# Patient Record
Sex: FEMALE | Race: OTHER | Employment: OTHER | URBAN - METROPOLITAN AREA
[De-identification: names, ages, dates, MRNs, and addresses within clinical notes are randomized per-mention and may not be internally consistent; named-entity substitution may affect disease eponyms.]

---

## 2018-06-08 ENCOUNTER — OFFICE VISIT (OUTPATIENT)
Dept: URGENT CARE | Facility: CLINIC | Age: 83
End: 2018-06-08
Payer: MEDICARE

## 2018-06-08 VITALS
BODY MASS INDEX: 22.47 KG/M2 | HEART RATE: 88 BPM | HEIGHT: 61 IN | TEMPERATURE: 98 F | WEIGHT: 119 LBS | RESPIRATION RATE: 16 BRPM | SYSTOLIC BLOOD PRESSURE: 124 MMHG | OXYGEN SATURATION: 95 % | DIASTOLIC BLOOD PRESSURE: 80 MMHG

## 2018-06-08 DIAGNOSIS — B02.9 HERPES ZOSTER WITHOUT COMPLICATION: ICD-10-CM

## 2018-06-08 PROCEDURE — 99204 OFFICE O/P NEW MOD 45 MIN: CPT | Performed by: FAMILY MEDICINE

## 2018-06-08 RX ORDER — ACYCLOVIR 800 MG/1
800 TABLET ORAL
Qty: 50 TAB | Refills: 0 | Status: SHIPPED | OUTPATIENT
Start: 2018-06-08 | End: 2018-06-18

## 2018-06-08 RX ORDER — LIDOCAINE 50 MG/G
1 PATCH TOPICAL EVERY 24 HOURS
Qty: 30 PATCH | Refills: 0 | Status: SHIPPED | OUTPATIENT
Start: 2018-06-08

## 2018-06-08 NOTE — PATIENT INSTRUCTIONS
Shingles  Shingles is an infection that causes a painful skin rash and fluid-filled blisters. Shingles is caused by the same virus that causes chickenpox.  Shingles only develops in people who:  · Have had chickenpox.  · Have gotten the chickenpox vaccine. (This is rare.)  The first symptoms of shingles may be itching, tingling, or pain in an area on your skin. A rash will follow in a few days or weeks. The rash is usually on one side of the body in a bandlike or beltlike pattern. Over time, the rash turns into fluid-filled blisters that break open, scab over, and dry up. Medicines may:  · Help you manage pain.  · Help you recover more quickly.  · Help to prevent long-term problems.  Follow these instructions at home:  Medicines  · Take medicines only as told by your doctor.  · Apply an anti-itch or numbing cream to the affected area as told by your doctor.  Blister and Rash Care  · Take a cool bath or put cool compresses on the area of the rash or blisters as told by your doctor. This may help with pain and itching.  · Keep your rash covered with a loose bandage (dressing). Wear loose-fitting clothing.  · Keep your rash and blisters clean with mild soap and cool water or as told by your doctor.  · Check your rash every day for signs of infection. These include redness, swelling, and pain that lasts or gets worse.  · Do not pick your blisters.  · Do not scratch your rash.  General instructions  · Rest as told by your doctor.  · Keep all follow-up visits as told by your doctor. This is important.  · Until your blisters scab over, your infection can cause chickenpox in people who have never had it or been vaccinated against it. To prevent this from happening, avoid touching other people or being around other people, especially:  ¨ Babies.  ¨ Pregnant women.  ¨ Children who have eczema.  ¨ Elderly people who have transplants.  ¨ People who have chronic illnesses, such as leukemia or AIDS.  Contact a doctor if:  · Your  pain does not get better with medicine.  · Your pain does not get better after the rash heals.  · Your rash looks infected. Signs of infection include:  ¨ Redness.  ¨ Swelling.  ¨ Pain that lasts or gets worse.  Get help right away if:  · The rash is on your face or nose.  · You have pain in your face, pain around your eye area, or loss of feeling on one side of your face.  · You have ear pain or you have ringing in your ear.  · You have loss of taste.  · Your condition gets worse.  This information is not intended to replace advice given to you by your health care provider. Make sure you discuss any questions you have with your health care provider.  Document Released: 06/05/2009 Document Revised: 08/13/2017 Document Reviewed: 09/29/2015  [employer] Interactive Patient Education © 2017 [employer]

## 2018-06-09 NOTE — PROGRESS NOTES
"Subjective:      Chief Complaint   Patient presents with   • Burn     burn from hot water in shower 10 days ago - getting worse               Rash      This is a new problem.  she burned herself with the hot water in the shower and presents today since the The current episode started yesterday. The problem is unchanged. Pain location: left flank. The rash is characterized by pain, redness and blistering.  Pain is sharp, constant \"burning\" and tender to touch\"       . Associated symptoms include fatigue. Pertinent negatives include no congestion, cough or fever. Past treatments include nothing.       Social History     Social History   • Marital status: Single     Spouse name: N/A   • Number of children: N/A   • Years of education: N/A     Occupational History   • Not on file.     Social History Main Topics   • Smoking status: Never Smoker   • Smokeless tobacco: Never Used   • Alcohol use No   • Drug use: No   • Sexual activity: No     Other Topics Concern   • Not on file     Social History Narrative   • No narrative on file         Past Medical History:   Diagnosis Date   • Anxiety    • Unspecified cataract          Current Outpatient Prescriptions on File Prior to Visit   Medication Sig Dispense Refill   • Multiple Vitamins-Minerals (OCUVITE) TABS Take 1 Tab by mouth every day.     • aspirin EC (ECOTRIN) 81 MG TBEC Take 81 mg by mouth every day.       No current facility-administered medications on file prior to visit.        Family history was reviewed and not pertinent       Review of Systems:  Review of Systems   Constitutional: Negative for fever.   HENT: no neck pain, headache or dizziness  Eyes: denies vision changes  Respiratory: no cough, congestion, SOB  Cardiovascular: denies palpations, chest pain   Gastrointestinal: denies diarrhea, abdominal pain or constipation.  No blood in stool.  Musculoskeletal: denies back pain or joint pain    Skin: +   Rash.   No itching  Neurological: No numbness or tingling. " "  10 point ROS otherwise negative, except per HPI       Objective:     Blood pressure 124/80, pulse 88, temperature 36.7 °C (98 °F), resp. rate 16, height 1.537 m (5' 0.51\"), weight 54 kg (119 lb), SpO2 95 %.    Physical Exam   Constitutional: She is oriented to person, place, and time. She appears well-developed and well-nourished. No distress.   HENT:   Head: Normocephalic and atraumatic.   Mouth/Throat: No oropharyngeal exudate.   Eyes: Conjunctivae are normal. Pupils are equal, round, and reactive to light. No scleral icterus.   Cardiovascular: Normal rate, regular rhythm and normal heart sounds.    Pulmonary/Chest: Effort normal and breath sounds normal. No respiratory distress. Pt has no wheezes. Pt has no rales.   Neurological: pt is alert and oriented to person, place, and time. No cranial nerve deficit.   Skin: Skin is warm. Rash (vesicular lesions on an erythematous base in a dermatome distribution over left flank and left chest, but does not cross midline) noted. Pt is not diaphoretic.   Nursing note and vitals reviewed.              Assessment/Plan:        1. Herpes zoster without complication     - acyclovir (ZOVIRAX) 800 MG Tab; Take 1 Tab by mouth 5 Times a Day for 10 days.  Dispense: 50 Tab; Refill: 0  - lidocaine (LIDODERM) 5 % Patch; Apply 1 Patch to skin as directed every 24 hours.  Dispense: 30 Patch; Refill: 0      Follow up in one week if no improvement, sooner if symptoms worsen.        "

## 2021-01-11 DIAGNOSIS — Z23 NEED FOR VACCINATION: ICD-10-CM

## 2021-05-05 ENCOUNTER — PATIENT OUTREACH (OUTPATIENT)
Dept: HEALTH INFORMATION MANAGEMENT | Facility: OTHER | Age: 86
End: 2021-05-05

## 2021-09-08 NOTE — NON-PROVIDER
Outcome: Left Message to schedule Comprehensive Health Assessment.    Please transfer to Patient Outreach Team at [phone] when patient returns call.        Attempt # 2

## 2024-02-06 ENCOUNTER — OFFICE VISIT (OUTPATIENT)
Facility: LOCATION | Age: 89
End: 2024-02-06
Payer: MEDICARE

## 2024-02-06 DIAGNOSIS — H35.3231 BILATERAL EXUDATIVE AGE-RELATED MACULAR DEGENERATION W/ ACTIVE CHOROIDAL NEOVASCULARIZATION: Primary | ICD-10-CM

## 2024-02-06 DIAGNOSIS — H04.123 DRY EYE SYNDROME OF BILATERAL LACRIMAL GLANDS: ICD-10-CM

## 2024-02-06 PROCEDURE — 92134 CPTRZ OPH DX IMG PST SGM RTA: CPT | Performed by: OPHTHALMOLOGY

## 2024-02-06 PROCEDURE — 99204 OFFICE O/P NEW MOD 45 MIN: CPT | Performed by: OPHTHALMOLOGY

## 2024-02-06 RX ORDER — MOXIFLOXACIN 5 MG/ML
0.5 % SOLUTION/ DROPS OPHTHALMIC
Qty: 3 | Refills: 5 | Status: ACTIVE
Start: 2024-02-06

## 2024-02-06 ASSESSMENT — INTRAOCULAR PRESSURE
OD: 14
OS: 14

## 2024-02-06 ASSESSMENT — VISUAL ACUITY
OD: 20/400
OS: 20/400